# Patient Record
Sex: FEMALE | Race: ASIAN | NOT HISPANIC OR LATINO | URBAN - METROPOLITAN AREA
[De-identification: names, ages, dates, MRNs, and addresses within clinical notes are randomized per-mention and may not be internally consistent; named-entity substitution may affect disease eponyms.]

---

## 2020-01-01 ENCOUNTER — INPATIENT (INPATIENT)
Facility: HOSPITAL | Age: 55
LOS: 0 days | End: 2020-07-12
Attending: INTERNAL MEDICINE | Admitting: INTERNAL MEDICINE
Payer: COMMERCIAL

## 2020-01-01 VITALS — WEIGHT: 134.7 LBS | HEART RATE: 60 BPM | RESPIRATION RATE: 30 BRPM | OXYGEN SATURATION: 58 %

## 2020-01-01 VITALS — SYSTOLIC BLOOD PRESSURE: 135 MMHG | DIASTOLIC BLOOD PRESSURE: 94 MMHG | HEART RATE: 42 BPM

## 2020-01-01 DIAGNOSIS — I21.3 ST ELEVATION (STEMI) MYOCARDIAL INFARCTION OF UNSPECIFIED SITE: ICD-10-CM

## 2020-01-01 DIAGNOSIS — I46.9 CARDIAC ARREST, CAUSE UNSPECIFIED: ICD-10-CM

## 2020-01-01 DIAGNOSIS — E11.9 TYPE 2 DIABETES MELLITUS WITHOUT COMPLICATIONS: ICD-10-CM

## 2020-01-01 DIAGNOSIS — I10 ESSENTIAL (PRIMARY) HYPERTENSION: ICD-10-CM

## 2020-01-01 LAB
ALBUMIN SERPL ELPH-MCNC: 2.7 G/DL — LOW (ref 3.3–5)
ALP SERPL-CCNC: 84 U/L — SIGNIFICANT CHANGE UP (ref 40–120)
ALT FLD-CCNC: 333 U/L — HIGH (ref 4–33)
AMPHET UR-MCNC: NEGATIVE — SIGNIFICANT CHANGE UP
ANION GAP SERPL CALC-SCNC: 25 MMO/L — HIGH (ref 7–14)
APAP SERPL-MCNC: < 15 UG/ML — LOW (ref 15–25)
APPEARANCE UR: CLEAR — SIGNIFICANT CHANGE UP
APTT BLD: 55.1 SEC — HIGH (ref 27–36.3)
AST SERPL-CCNC: 401 U/L — HIGH (ref 4–32)
B-OH-BUTYR SERPL-SCNC: 0.3 MMOL/L — SIGNIFICANT CHANGE UP (ref 0–0.4)
BACTERIA # UR AUTO: NEGATIVE — SIGNIFICANT CHANGE UP
BARBITURATES UR SCN-MCNC: NEGATIVE — SIGNIFICANT CHANGE UP
BASE EXCESS BLDA CALC-SCNC: -13.6 MMOL/L — SIGNIFICANT CHANGE UP
BASE EXCESS BLDA CALC-SCNC: -16.9 MMOL/L — SIGNIFICANT CHANGE UP
BASE EXCESS BLDV CALC-SCNC: -11.1 MMOL/L — SIGNIFICANT CHANGE UP
BASOPHILS # BLD AUTO: 0.05 K/UL — SIGNIFICANT CHANGE UP (ref 0–0.2)
BASOPHILS NFR BLD AUTO: 0.6 % — SIGNIFICANT CHANGE UP (ref 0–2)
BASOPHILS NFR SPEC: 1.2 % — SIGNIFICANT CHANGE UP (ref 0–2)
BENZODIAZ UR-MCNC: NEGATIVE — SIGNIFICANT CHANGE UP
BILIRUB SERPL-MCNC: < 0.2 MG/DL — LOW (ref 0.2–1.2)
BILIRUB UR-MCNC: NEGATIVE — SIGNIFICANT CHANGE UP
BLASTS # FLD: 0 % — SIGNIFICANT CHANGE UP (ref 0–0)
BLD GP AB SCN SERPL QL: NEGATIVE — SIGNIFICANT CHANGE UP
BLOOD GAS ARTERIAL - FIO2: 100 — SIGNIFICANT CHANGE UP
BLOOD GAS VENOUS - CREATININE: 1.05 MG/DL — SIGNIFICANT CHANGE UP (ref 0.5–1.3)
BLOOD UR QL VISUAL: NEGATIVE — SIGNIFICANT CHANGE UP
BUN SERPL-MCNC: 15 MG/DL — SIGNIFICANT CHANGE UP (ref 7–23)
CALCIUM SERPL-MCNC: 12.7 MG/DL — HIGH (ref 8.4–10.5)
CANNABINOIDS UR-MCNC: NEGATIVE — SIGNIFICANT CHANGE UP
CHLORIDE BLDA-SCNC: 104 MMOL/L — SIGNIFICANT CHANGE UP (ref 96–108)
CHLORIDE BLDA-SCNC: 106 MMOL/L — SIGNIFICANT CHANGE UP (ref 96–108)
CHLORIDE BLDV-SCNC: 101 MMOL/L — SIGNIFICANT CHANGE UP (ref 96–108)
CHLORIDE SERPL-SCNC: 94 MMOL/L — LOW (ref 98–107)
CK SERPL-CCNC: 664 U/L — HIGH (ref 25–170)
CO2 SERPL-SCNC: 16 MMOL/L — LOW (ref 22–31)
COCAINE METAB.OTHER UR-MCNC: NEGATIVE — SIGNIFICANT CHANGE UP
COLOR SPEC: SIGNIFICANT CHANGE UP
CREAT SERPL-MCNC: 0.9 MG/DL — SIGNIFICANT CHANGE UP (ref 0.5–1.3)
EOSINOPHIL # BLD AUTO: 0.14 K/UL — SIGNIFICANT CHANGE UP (ref 0–0.5)
EOSINOPHIL NFR BLD AUTO: 1.5 % — SIGNIFICANT CHANGE UP (ref 0–6)
EOSINOPHIL NFR FLD: 3.4 % — SIGNIFICANT CHANGE UP (ref 0–6)
ETHANOL BLD-MCNC: < 10 MG/DL — SIGNIFICANT CHANGE UP
GAS PNL BLDV: 137 MMOL/L — SIGNIFICANT CHANGE UP (ref 136–146)
GIANT PLATELETS BLD QL SMEAR: PRESENT — SIGNIFICANT CHANGE UP
GLUCOSE BLDA-MCNC: 804 MG/DL — CRITICAL HIGH (ref 70–99)
GLUCOSE BLDA-MCNC: 866 MG/DL — CRITICAL HIGH (ref 70–99)
GLUCOSE BLDC GLUCOMTR-MCNC: >600 MG/DL — CRITICAL HIGH (ref 70–99)
GLUCOSE BLDC GLUCOMTR-MCNC: >600 MG/DL — CRITICAL HIGH (ref 70–99)
GLUCOSE BLDV-MCNC: 740 MG/DL — CRITICAL HIGH (ref 70–99)
GLUCOSE SERPL-MCNC: 789 MG/DL — CRITICAL HIGH (ref 70–99)
GLUCOSE UR-MCNC: >1000 — HIGH
HBA1C BLD-MCNC: 12 % — HIGH (ref 4–5.6)
HCO3 BLDA-SCNC: 11 MMOL/L — LOW (ref 22–26)
HCO3 BLDA-SCNC: 13 MMOL/L — LOW (ref 22–26)
HCO3 BLDV-SCNC: 13 MMOL/L — LOW (ref 20–27)
HCT VFR BLD CALC: 36 % — SIGNIFICANT CHANGE UP (ref 34.5–45)
HCT VFR BLDA CALC: 31.9 % — LOW (ref 34.5–46.5)
HCT VFR BLDA CALC: 38 % — SIGNIFICANT CHANGE UP (ref 34.5–46.5)
HCT VFR BLDV CALC: 35.9 % — SIGNIFICANT CHANGE UP (ref 34.5–45)
HGB BLD-MCNC: 11.2 G/DL — LOW (ref 11.5–15.5)
HGB BLDA-MCNC: 10.3 G/DL — LOW (ref 11.5–15.5)
HGB BLDA-MCNC: 12.4 G/DL — SIGNIFICANT CHANGE UP (ref 11.5–15.5)
HGB BLDV-MCNC: 11.6 G/DL — SIGNIFICANT CHANGE UP (ref 11.5–15.5)
HYALINE CASTS # UR AUTO: NEGATIVE — SIGNIFICANT CHANGE UP
IMM GRANULOCYTES NFR BLD AUTO: 7 % — HIGH (ref 0–1.5)
INR BLD: 1.02 — SIGNIFICANT CHANGE UP (ref 0.88–1.17)
KETONES UR-MCNC: NEGATIVE — SIGNIFICANT CHANGE UP
LACTATE BLDA-SCNC: 17 MMOL/L — CRITICAL HIGH (ref 0.5–2)
LACTATE BLDA-SCNC: 24 MMOL/L — CRITICAL HIGH (ref 0.5–2)
LACTATE BLDV-MCNC: 14.7 MMOL/L — CRITICAL HIGH (ref 0.5–2)
LEUKOCYTE ESTERASE UR-ACNC: NEGATIVE — SIGNIFICANT CHANGE UP
LYMPHOCYTES # BLD AUTO: 4.95 K/UL — HIGH (ref 1–3.3)
LYMPHOCYTES # BLD AUTO: 54.6 % — HIGH (ref 13–44)
LYMPHOCYTES NFR SPEC AUTO: 36.8 % — SIGNIFICANT CHANGE UP (ref 13–44)
MAGNESIUM SERPL-MCNC: 2.2 MG/DL — SIGNIFICANT CHANGE UP (ref 1.6–2.6)
MCHC RBC-ENTMCNC: 28.4 PG — SIGNIFICANT CHANGE UP (ref 27–34)
MCHC RBC-ENTMCNC: 31.1 % — LOW (ref 32–36)
MCV RBC AUTO: 91.4 FL — SIGNIFICANT CHANGE UP (ref 80–100)
METAMYELOCYTES # FLD: 2.3 % — HIGH (ref 0–1)
METHADONE UR-MCNC: NEGATIVE — SIGNIFICANT CHANGE UP
MONOCYTES # BLD AUTO: 0.2 K/UL — SIGNIFICANT CHANGE UP (ref 0–0.9)
MONOCYTES NFR BLD AUTO: 2.2 % — SIGNIFICANT CHANGE UP (ref 2–14)
MONOCYTES NFR BLD: 1.2 % — LOW (ref 2–9)
MYELOCYTES NFR BLD: 3.4 % — HIGH (ref 0–0)
NEUTROPHIL AB SER-ACNC: 48.3 % — SIGNIFICANT CHANGE UP (ref 43–77)
NEUTROPHILS # BLD AUTO: 3.09 K/UL — SIGNIFICANT CHANGE UP (ref 1.8–7.4)
NEUTROPHILS NFR BLD AUTO: 34.1 % — LOW (ref 43–77)
NEUTS BAND # BLD: 3.4 % — SIGNIFICANT CHANGE UP (ref 0–6)
NITRITE UR-MCNC: NEGATIVE — SIGNIFICANT CHANGE UP
NRBC # BLD: 3 /100WBC — SIGNIFICANT CHANGE UP
NRBC # FLD: 0.08 K/UL — SIGNIFICANT CHANGE UP (ref 0–0)
NT-PROBNP SERPL-SCNC: 29.5 PG/ML — SIGNIFICANT CHANGE UP
OPIATES UR-MCNC: NEGATIVE — SIGNIFICANT CHANGE UP
OTHER - HEMATOLOGY %: 0 — SIGNIFICANT CHANGE UP
OXYCODONE UR-MCNC: NEGATIVE — SIGNIFICANT CHANGE UP
PCO2 BLDA: 53 MMHG — HIGH (ref 32–48)
PCO2 BLDA: 54 MMHG — HIGH (ref 32–48)
PCO2 BLDV: 95 MMHG — CRITICAL HIGH (ref 41–51)
PCP UR-MCNC: NEGATIVE — SIGNIFICANT CHANGE UP
PH BLDA: 6.99 PH — CRITICAL LOW (ref 7.35–7.45)
PH BLDA: 7.07 PH — CRITICAL LOW (ref 7.35–7.45)
PH BLDV: 6.95 PH — CRITICAL LOW (ref 7.32–7.43)
PH UR: 6 — SIGNIFICANT CHANGE UP (ref 5–8)
PHOSPHATE SERPL-MCNC: 8.5 MG/DL — HIGH (ref 2.5–4.5)
PLATELET # BLD AUTO: 109 K/UL — LOW (ref 150–400)
PLATELET COUNT - ESTIMATE: SIGNIFICANT CHANGE UP
PMV BLD: 11.8 FL — SIGNIFICANT CHANGE UP (ref 7–13)
PO2 BLDA: 107 MMHG — SIGNIFICANT CHANGE UP (ref 83–108)
PO2 BLDA: 56 MMHG — LOW (ref 83–108)
PO2 BLDV: 38 MMHG — SIGNIFICANT CHANGE UP (ref 35–40)
POTASSIUM BLDA-SCNC: 3.8 MMOL/L — SIGNIFICANT CHANGE UP (ref 3.4–4.5)
POTASSIUM BLDA-SCNC: 4.2 MMOL/L — SIGNIFICANT CHANGE UP (ref 3.4–4.5)
POTASSIUM BLDV-SCNC: 4.2 MMOL/L — SIGNIFICANT CHANGE UP (ref 3.4–4.5)
POTASSIUM SERPL-MCNC: 4.3 MMOL/L — SIGNIFICANT CHANGE UP (ref 3.5–5.3)
POTASSIUM SERPL-SCNC: 4.3 MMOL/L — SIGNIFICANT CHANGE UP (ref 3.5–5.3)
PROMYELOCYTES # FLD: 0 % — SIGNIFICANT CHANGE UP (ref 0–0)
PROT SERPL-MCNC: 4.9 G/DL — LOW (ref 6–8.3)
PROT UR-MCNC: 20 — SIGNIFICANT CHANGE UP
PROTHROM AB SERPL-ACNC: 11.6 SEC — SIGNIFICANT CHANGE UP (ref 9.8–13.1)
RBC # BLD: 3.94 M/UL — SIGNIFICANT CHANGE UP (ref 3.8–5.2)
RBC # FLD: 12.5 % — SIGNIFICANT CHANGE UP (ref 10.3–14.5)
RBC CASTS # UR COMP ASSIST: SIGNIFICANT CHANGE UP (ref 0–?)
REVIEW TO FOLLOW: YES — SIGNIFICANT CHANGE UP
RH IG SCN BLD-IMP: POSITIVE — SIGNIFICANT CHANGE UP
SALICYLATES SERPL-MCNC: < 5 MG/DL — LOW (ref 15–30)
SAO2 % BLDA: 77.8 % — LOW (ref 95–99)
SAO2 % BLDA: 94.2 % — LOW (ref 95–99)
SAO2 % BLDV: 43.6 % — LOW (ref 60–85)
SARS-COV-2 RNA SPEC QL NAA+PROBE: SIGNIFICANT CHANGE UP
SMUDGE CELLS # BLD: PRESENT — SIGNIFICANT CHANGE UP
SODIUM BLDA-SCNC: 137 MMOL/L — SIGNIFICANT CHANGE UP (ref 136–146)
SODIUM BLDA-SCNC: 145 MMOL/L — SIGNIFICANT CHANGE UP (ref 136–146)
SODIUM SERPL-SCNC: 135 MMOL/L — SIGNIFICANT CHANGE UP (ref 135–145)
SP GR SPEC: 1.04 — SIGNIFICANT CHANGE UP (ref 1–1.04)
SQUAMOUS # UR AUTO: SIGNIFICANT CHANGE UP
TROPONIN T, HIGH SENSITIVITY: 102 NG/L — CRITICAL HIGH (ref ?–14)
TSH SERPL-MCNC: 3.97 UIU/ML — SIGNIFICANT CHANGE UP (ref 0.27–4.2)
UROBILINOGEN FLD QL: NORMAL — SIGNIFICANT CHANGE UP
VARIANT LYMPHS # BLD: 0 % — SIGNIFICANT CHANGE UP
WBC # BLD: 9.06 K/UL — SIGNIFICANT CHANGE UP (ref 3.8–10.5)
WBC # FLD AUTO: 9.06 K/UL — SIGNIFICANT CHANGE UP (ref 3.8–10.5)
WBC UR QL: SIGNIFICANT CHANGE UP (ref 0–?)

## 2020-01-01 PROCEDURE — 36561 INSERT TUNNELED CV CATH: CPT

## 2020-01-01 PROCEDURE — 70450 CT HEAD/BRAIN W/O DYE: CPT | Mod: 26

## 2020-01-01 PROCEDURE — 36600 WITHDRAWAL OF ARTERIAL BLOOD: CPT | Mod: 59

## 2020-01-01 PROCEDURE — 99291 CRITICAL CARE FIRST HOUR: CPT | Mod: 25

## 2020-01-01 PROCEDURE — 43753 TX GASTRO INTUB W/ASP: CPT | Mod: 59

## 2020-01-01 PROCEDURE — 99292 CRITICAL CARE ADDL 30 MIN: CPT | Mod: 25

## 2020-01-01 PROCEDURE — 71045 X-RAY EXAM CHEST 1 VIEW: CPT | Mod: 26

## 2020-01-01 PROCEDURE — 99223 1ST HOSP IP/OBS HIGH 75: CPT

## 2020-01-01 RX ORDER — ASPIRIN/CALCIUM CARB/MAGNESIUM 324 MG
324 TABLET ORAL ONCE
Refills: 0 | Status: DISCONTINUED | OUTPATIENT
Start: 2020-01-01 | End: 2020-01-01

## 2020-01-01 RX ORDER — CHLORHEXIDINE GLUCONATE 213 G/1000ML
15 SOLUTION TOPICAL EVERY 12 HOURS
Refills: 0 | Status: DISCONTINUED | OUTPATIENT
Start: 2020-01-01 | End: 2020-01-01

## 2020-01-01 RX ORDER — VASOPRESSIN 20 [USP'U]/ML
0.04 INJECTION INTRAVENOUS
Qty: 50 | Refills: 0 | Status: DISCONTINUED | OUTPATIENT
Start: 2020-01-01 | End: 2020-01-01

## 2020-01-01 RX ORDER — METFORMIN HYDROCHLORIDE 850 MG/1
1 TABLET ORAL
Qty: 0 | Refills: 0 | DISCHARGE

## 2020-01-01 RX ORDER — TICAGRELOR 90 MG/1
180 TABLET ORAL ONCE
Refills: 0 | Status: COMPLETED | OUTPATIENT
Start: 2020-01-01 | End: 2020-01-01

## 2020-01-01 RX ORDER — HEPARIN SODIUM 5000 [USP'U]/ML
4000 INJECTION INTRAVENOUS; SUBCUTANEOUS ONCE
Refills: 0 | Status: COMPLETED | OUTPATIENT
Start: 2020-01-01 | End: 2020-01-01

## 2020-01-01 RX ORDER — HEPARIN SODIUM 5000 [USP'U]/ML
INJECTION INTRAVENOUS; SUBCUTANEOUS
Qty: 25000 | Refills: 0 | Status: DISCONTINUED | OUTPATIENT
Start: 2020-01-01 | End: 2020-01-01

## 2020-01-01 RX ORDER — SEMAGLUTIDE 0.68 MG/ML
0 INJECTION, SOLUTION SUBCUTANEOUS
Qty: 0 | Refills: 0 | DISCHARGE

## 2020-01-01 RX ORDER — NOREPINEPHRINE BITARTRATE/D5W 8 MG/250ML
0.05 PLASTIC BAG, INJECTION (ML) INTRAVENOUS
Qty: 16 | Refills: 0 | Status: DISCONTINUED | OUTPATIENT
Start: 2020-01-01 | End: 2020-01-01

## 2020-01-01 RX ORDER — PIPERACILLIN AND TAZOBACTAM 4; .5 G/20ML; G/20ML
3.38 INJECTION, POWDER, LYOPHILIZED, FOR SOLUTION INTRAVENOUS ONCE
Refills: 0 | Status: COMPLETED | OUTPATIENT
Start: 2020-01-01 | End: 2020-01-01

## 2020-01-01 RX ORDER — INSULIN HUMAN 100 [IU]/ML
10 INJECTION, SOLUTION SUBCUTANEOUS
Qty: 100 | Refills: 0 | Status: DISCONTINUED | OUTPATIENT
Start: 2020-01-01 | End: 2020-01-01

## 2020-01-01 RX ORDER — EPINEPHRINE 0.3 MG/.3ML
1 INJECTION INTRAMUSCULAR; SUBCUTANEOUS
Qty: 4 | Refills: 0 | Status: DISCONTINUED | OUTPATIENT
Start: 2020-01-01 | End: 2020-01-01

## 2020-01-01 RX ORDER — AMIODARONE HYDROCHLORIDE 400 MG/1
1 TABLET ORAL
Qty: 900 | Refills: 0 | Status: DISCONTINUED | OUTPATIENT
Start: 2020-01-01 | End: 2020-01-01

## 2020-01-01 RX ORDER — SODIUM CHLORIDE 9 MG/ML
1000 INJECTION, SOLUTION INTRAVENOUS ONCE
Refills: 0 | Status: COMPLETED | OUTPATIENT
Start: 2020-01-01 | End: 2020-01-01

## 2020-01-01 RX ORDER — SODIUM BICARBONATE 1 MEQ/ML
0.05 SYRINGE (ML) INTRAVENOUS
Qty: 50 | Refills: 0 | Status: DISCONTINUED | OUTPATIENT
Start: 2020-01-01 | End: 2020-01-01

## 2020-01-01 RX ORDER — LOSARTAN POTASSIUM 100 MG/1
1 TABLET, FILM COATED ORAL
Qty: 0 | Refills: 0 | DISCHARGE

## 2020-01-01 RX ORDER — HEPARIN SODIUM 5000 [USP'U]/ML
4000 INJECTION INTRAVENOUS; SUBCUTANEOUS EVERY 6 HOURS
Refills: 0 | Status: DISCONTINUED | OUTPATIENT
Start: 2020-01-01 | End: 2020-01-01

## 2020-01-01 RX ORDER — ASPIRIN/CALCIUM CARB/MAGNESIUM 324 MG
325 TABLET ORAL ONCE
Refills: 0 | Status: COMPLETED | OUTPATIENT
Start: 2020-01-01 | End: 2020-01-01

## 2020-01-01 RX ORDER — HEPARIN SODIUM 5000 [USP'U]/ML
1000 INJECTION INTRAVENOUS; SUBCUTANEOUS
Qty: 25000 | Refills: 0 | Status: DISCONTINUED | OUTPATIENT
Start: 2020-01-01 | End: 2020-01-01

## 2020-01-01 RX ORDER — CLOPIDOGREL BISULFATE 75 MG/1
300 TABLET, FILM COATED ORAL ONCE
Refills: 0 | Status: DISCONTINUED | OUTPATIENT
Start: 2020-01-01 | End: 2020-01-01

## 2020-01-01 RX ORDER — PIPERACILLIN AND TAZOBACTAM 4; .5 G/20ML; G/20ML
3.38 INJECTION, POWDER, LYOPHILIZED, FOR SOLUTION INTRAVENOUS EVERY 8 HOURS
Refills: 0 | Status: DISCONTINUED | OUTPATIENT
Start: 2020-01-01 | End: 2020-01-01

## 2020-01-01 RX ORDER — SODIUM BICARBONATE 1 MEQ/ML
0.09 SYRINGE (ML) INTRAVENOUS
Qty: 75 | Refills: 0 | Status: DISCONTINUED | OUTPATIENT
Start: 2020-01-01 | End: 2020-01-01

## 2020-01-01 RX ADMIN — Medication 325 MILLIGRAM(S): at 07:09

## 2020-01-01 RX ADMIN — PIPERACILLIN AND TAZOBACTAM 200 GRAM(S): 4; .5 INJECTION, POWDER, LYOPHILIZED, FOR SOLUTION INTRAVENOUS at 03:49

## 2020-01-01 RX ADMIN — TICAGRELOR 180 MILLIGRAM(S): 90 TABLET ORAL at 07:08

## 2020-01-01 RX ADMIN — EPINEPHRINE 300 MICROGRAM(S)/KG/MIN: 0.3 INJECTION INTRAMUSCULAR; SUBCUTANEOUS at 04:33

## 2020-01-01 RX ADMIN — INSULIN HUMAN 7 UNIT(S)/HR: 100 INJECTION, SOLUTION SUBCUTANEOUS at 04:33

## 2020-01-01 RX ADMIN — Medication 3.75 MICROGRAM(S)/KG/MIN: at 07:57

## 2020-01-01 RX ADMIN — Medication 100 MEQ/KG/HR: at 04:23

## 2020-01-01 RX ADMIN — VASOPRESSIN 2.4 UNIT(S)/MIN: 20 INJECTION INTRAVENOUS at 07:59

## 2020-01-01 RX ADMIN — SODIUM CHLORIDE 1000 MILLILITER(S): 9 INJECTION, SOLUTION INTRAVENOUS at 03:23

## 2020-01-01 RX ADMIN — INSULIN HUMAN 10 UNIT(S)/HR: 100 INJECTION, SOLUTION SUBCUTANEOUS at 07:57

## 2020-01-01 RX ADMIN — Medication 100 MEQ/KG/HR: at 07:58

## 2020-01-01 RX ADMIN — EPINEPHRINE 300 MICROGRAM(S)/KG/MIN: 0.3 INJECTION INTRAMUSCULAR; SUBCUTANEOUS at 07:12

## 2020-01-01 RX ADMIN — HEPARIN SODIUM 4000 UNIT(S): 5000 INJECTION INTRAVENOUS; SUBCUTANEOUS at 04:24

## 2020-01-01 RX ADMIN — HEPARIN SODIUM 10 UNIT(S)/HR: 5000 INJECTION INTRAVENOUS; SUBCUTANEOUS at 07:56

## 2020-01-01 RX ADMIN — HEPARIN SODIUM 950 UNIT(S)/HR: 5000 INJECTION INTRAVENOUS; SUBCUTANEOUS at 04:23

## 2020-07-12 NOTE — ED ADULT NURSE REASSESSMENT NOTE - CONDITION
handoff rpt received from VALERY Arce pt prepared for transport. handoff rpt received from VALERY Arce pt Admitted CCU DX DKA,STEMI pt prepared for transport.

## 2020-07-12 NOTE — ED PROVIDER NOTE - CLINICAL SUMMARY MEDICAL DECISION MAKING FREE TEXT BOX
54F with hx of DM presenting as cardiac arrest. Presumed to be STEMI with possible DKA as complication. Will send ACS, DKA labs, EKG, CXR, TBA.

## 2020-07-12 NOTE — H&P ADULT - PROBLEM SELECTOR PLAN 2
LANNY in all leads.   No cath at this time  Continue heparin gtt  Give ASA 365mg, Plavix 600mg load when OG tube placed

## 2020-07-12 NOTE — H&P ADULT - PROBLEM SELECTOR PLAN 3
H/O DM II on metformin. HgA1c >12, BS >600  Continue Insulin gtt for presumed DKA  Beta Hydroxy - Butyrate pending

## 2020-07-12 NOTE — DISCHARGE NOTE FOR THE EXPIRED PATIENT - HOSPITAL COURSE
55 yo female with PMH of HTN, DM BIBEMS as cardiac arrest. Patient was at a party earlier tonight, found pulseless around 40 minutes PTA. Initial rhythm was Vfib. EMS intubated the patient in the field, shocked 3 times and pushed epi 4 times. ROSC was achieved for 8 minutes, but again lost pulses. Found to be in asystole and chest compressions were resumed. Per EMS, FS read as high. In ED intubated with EMS performing chest compressions. EKG showed STEMI. patient received a few rounds of epi, bicarb x 2, calcium x 2. Patient eventually achieved ROSC. Started on epi drip, insulin drip for presumed DKA. Left crash fem placed, right IO placed. During chest compressions, large amounts of gastric contents aspirated. Given Vanc/Zosyn. Case discussed with interventionalist. Patient was not a candidate for cardiac cathterization. CT head done revealing catastrophic anoxic brain injury. Patient  in the CCU, on ventillator with maximum pressors and transcutaneous pacing. At 8:06, she lost pulse, RN, NP and family  at bedside at this time and opted for no further compressions, oral DNR given by family and patient was pronounced at 8:06am.

## 2020-07-12 NOTE — ED ADULT NURSE REASSESSMENT NOTE - NS ED NURSE REASSESS COMMENT FT1
Nell RN pt ready for transport: CCU NP called notified requesting  asst with transport. ED MD Mooney notified.Pt hypotensive 73/44 epi gtt increased.BP improving 106/75 hr 78 pox96% pt transported to CCU8 Nell RN pt ready for transport: CCU NP called notified requesting  asst with transport. ED MD Mooney notified.Pt hypotensive 73/44 epi gtt increased.BP improving 106/75 hr 78 pox96% pt transported to CCU8  Pt received with mult infusions #1Hepgtt at 9.5 infusing via left ac. #2Insulin gtt at 7cc/7units/hr.   Left femoral central line, with #3bicarb gtt at 75cc, #4 Epinephrine gtt at .6mcg/kg/min.  Vent setting , rr 22 fio2 100% PEEP/Cpap5.   repeat FBSHI Nell RN pt ready for transport: CCU NP called notified requesting  asst with transport. ED MD Mooney notified.Pt hypotensive 73/44 epi gtt increased.BP improving 106/75 hr 78 pox96% pt transported to CCU8  Pt received with mult infusions #1Hepgtt at 9.5 infusing via left ac. #2Insulin gtt at 7cc/7units/hr.   Left femoral central line, with #3bicarb gtt at 75cc, #4 Epinephrine gtt at .6mcg/kg/min.  Vent setting , rr 30 fio2 100% PEEP/Cpap8.   repeat FBSHI

## 2020-07-12 NOTE — ED PROVIDER NOTE - OBJECTIVE STATEMENT
54F with hx of DM presenting as cardiac arrest. Per EMS, patient was at a party earlier tonight, found pulseless around 40 minutes PTA. Initial rhythm was Vfib. EMS intubated the patient in the field, shocked 3 times and pushed epi 4 times. ROSC was achieved for 8 minutes, but again lost pulses. Found to be in asystole and chest compressions were resumed. Per EMS, FS read as high. Patient arrived in Trauma room C intubated with EMS performing chest compressions, with right hand IV in place. EKG showed STEMI. In ED, patient received a few rounds of epi, bicarb x 2, calcium x 2. Patient eventually achieved ROSC. Started on epi drip, insulin drip for presumed DKA. Left crash fem placed, right IO placed. 54F with hx of DM presenting as cardiac arrest. Per EMS, patient was at a party earlier tonight, found pulseless around 40 minutes PTA. Initial rhythm was Vfib. EMS intubated the patient in the field, shocked 3 times and pushed epi 4 times. ROSC was achieved for 8 minutes, but again lost pulses. Found to be in asystole and chest compressions were resumed. Per EMS, FS read as high. Patient arrived in Trauma room C intubated with EMS performing chest compressions, with right hand IV in place. EKG showed STEMI. In ED, patient received a few rounds of epi, bicarb x 2, calcium x 2. Patient eventually achieved ROSC. Started on epi drip, insulin drip for presumed DKA. Left crash fem placed, right IO placed. During chest compressions, large amounts of gastric contents aspirated.

## 2020-07-12 NOTE — ED ADULT NURSE REASSESSMENT NOTE - NS ED NURSE REASSESS COMMENT FT1
Patient was intubted by MD breathing w. ease on vent Tidal 430 Peep 5 RR 22 02 75%. Lip line 22. Epi driplower to 0.5mcg/kg/min

## 2020-07-12 NOTE — ED PROCEDURE NOTE - CPROC ED INFUS LINE DETAIL1
Ultrasound guidance was used during placement./All lumen(s) aspirated and flushed without difficulty./The guidewire was recovered.

## 2020-07-12 NOTE — ED PROVIDER NOTE - CARE PLAN
Principal Discharge DX:	Cardiac arrest  Secondary Diagnosis:	DKA (diabetic ketoacidoses)  Secondary Diagnosis:	Acidosis  Secondary Diagnosis:	ST elevation myocardial infarction (STEMI), unspecified artery

## 2020-07-12 NOTE — ED PROVIDER NOTE - ATTENDING CONTRIBUTION TO CARE
Attending MD Mooney.  Agree with above.  Pt is a 55 yo female with pmhx of DM who presents to ED BIBEMS after she reportedly was at a party this evening, got home, wasn't feeling well and was noted to become suddenly unresponsive and on EMS arrival was in vfib.  Pt was shocked x 3 and received 4 epi's, 300 amio and had FS 'high' in field.  EMS achieved ROSC x 8 min but pt then arrested again in asystole en route.  Pt intubated in field. On arrival to ED pt in asystole, coded per nursing documentation.  Endotracheal tube confirmed direct visualization, bilateral breath sounds.  Please refer to code chart for meds/timing.  Received calcium, epi, additional amio, 10 units insulin, bicarb.  L femoral central line placed, R tibial IO placed.  OG placed.  Pt begun on epi drip, insulin drip and bicarb drip.  Cardiology called immediately following 12 lead EKG after ROSC achieved and EKG demonstrates concern for STEMI.  Given finger stick 'high' and EKG c/w concern for severe hyperkalemia.  Cards deferring on cath at this time. MICU called for admission.    Pt's daughter in family room.  (Ofelai Scott [phone] - pt's primary decision maker, pt's only child, no sig other, Pt's mother is secondary decision maker Heather Julianna [phone]) Pt's daughter updated re: status.  Endorses hx of DM on metformin/Januvia, HTN on losartan.  Daughter states that pt was at a party dancing and began to endorse central CP, generalized malaise and headache.  She went home and took a shower then made a cup of tea and was witnessed arrest. Pt lives with daughter and mother.  Rest of family (inc mother and brother) in parking lot and have also been updated by myself and Resident physician Aramis.  Family advised of critical condition and concern for underlying brain injury.  Counseled re: next 24-48 hours being critical to both determine pt's likelihood of survival as well as her degree of brain injury.  Family asks to be kept updated.  Daughter to be point of first contact.  At this time pt is full code with all necessary interventions desired.  Please keep daughter informed.    0334 - VBG without sig hyperK concerning for EKG changes reflective of underlying cardiac ischemia.  Cardiology recalled 2/2 change in presumptive etiology.  ABG pH 6.99, glucose 866, lactate 17   0350 - Cards in ED.  Repeat EKG concerning for STEMI. In absence of convincing hyperkalemia concern for STEMI.  Heparin drip ordered.  0410 - Cards has seen pt again s/p repeat EKG and states that 2/2 lack of mental status they will not cath pt.  In agreement with CT head and heparin drip/Plavix/ASA thereafter.  Will commence hypothermia protocol. Cards accepting pt for admission to CCU.

## 2020-07-12 NOTE — H&P ADULT - HISTORY OF PRESENT ILLNESS
53 yo female with PMH of HTN, DM presenting as cardiac arrest. Per EMS, patient was at a party earlier tonight, found pulseless around 40 minutes PTA. Initial rhythm was Vfib. EMS intubated the patient in the field, shocked 3 times and pushed epi 4 times. ROSC was achieved for 8 minutes, but again lost pulses. Found to be in asystole and chest compressions were resumed. Per EMS, FS read as high. Patient arrived in Trauma room C intubated with EMS performing chest compressions, with right hand IV in place. EKG showed STEMI. In ED, patient received a few rounds of epi, bicarb x 2, calcium x 2. Patient eventually achieved ROSC. Started on epi drip, insulin drip for presumed DKA. Left crash fem placed, right IO placed. During chest compressions, large amounts of gastric contents aspirated. 53 yo female with PMH of HTN, DM BIBEMS as cardiac arrest. Patient was at a party earlier tonight, found pulseless around 40 minutes PTA. Initial rhythm was Vfib. EMS intubated the patient in the field, shocked 3 times and pushed epi 4 times. ROSC was achieved for 8 minutes, but again lost pulses. Found to be in asystole and chest compressions were resumed. Per EMS, FS read as high. In ED intubated with EMS performing chest compressions. EKG showed STEMI. patient received a few rounds of epi, bicarb x 2, calcium x 2. Patient eventually achieved ROSC. Started on epi drip, insulin drip for presumed DKA. Left crash fem placed, right IO placed. During chest compressions, large amounts of gastric contents aspirated. Given Vanc/Zosyn. Case discussed with interventionalist. Not a candidate for cath at this time. Transfer to CCU.

## 2020-07-12 NOTE — ED PROCEDURE NOTE - NS ED PROCEDURE ASSISTED BY
Supervision was available
Supervision was available
The procedure was performed independently/Supervision was available

## 2020-07-12 NOTE — ED ADULT NURSE REASSESSMENT NOTE - ANCILLARY STATUS
ccu at bedside,/respiratory therapy at bedside/physician at bedside ccu NP  at bedside,/physician at bedside/respiratory therapy at bedside

## 2020-07-12 NOTE — H&P ADULT - PROBLEM SELECTOR PLAN 1
Pt down 40 minutes PTA of EMS. Intubated in field Vfib arrest with ROSC after 8 minutes. Asystole with ROSC. In ED Asystole with ROSC. Transferred to CCU Asystole with rosc after 18 minutes    Continue Epi gtt  Continue Levo gtt Pt down 40 minutes PTA of EMS. Intubated in field Vfib arrest with ROSC after 8 minutes. Asystole with ROSC. In ED Asystole with ROSC. Transferred to CCU Asystole with rosc after 18 minutes  Continue Epi gtt  Continue Levo gtt  Continue Vasopression  Continue Bicarb gtt  Hypothermia protocol

## 2020-07-12 NOTE — ED ADULT NURSE REASSESSMENT NOTE - STATUS
as per RN Claude Report to CCU bed 8  Respiratory at bedside for transport/awaiting transfer, no change

## 2020-07-12 NOTE — H&P ADULT - ASSESSMENT
53 yo female with PMH of HTN, DM BIBEMS as cardiac arrest. Patient was at a party earlier tonight, found pulseless around 40 minutes PTA. Initial rhythm was Vfib. EMS intubated the patient in the field, shocked 3 times and pushed epi 4 times. ROSC was achieved for 8 minutes, but again lost pulses. Found to be in asystole and chest compressions were resumed. Per EMS, FS read as high. In ED intubated with EMS performing chest compressions. EKG showed STEMI. patient received a few rounds of epi, bicarb x 2, calcium x 2. Patient eventually achieved ROSC. Started on epi drip, insulin drip for presumed DKA. Left crash fem placed, right IO placed. During chest compressions, large amounts of gastric contents aspirated. Given Vanc/Zosyn. Case discussed with interventionalist. Not a candidate for cath at this time. Transfer to CCU.

## 2020-07-12 NOTE — ED PROVIDER NOTE - CRITICAL CARE PROVIDED
consultation with other physicians/interpretation of diagnostic studies/documentation/direct patient care (not related to procedure)/consult w/ pt's family directly relating to pts condition/additional history taking

## 2020-07-12 NOTE — ED PROVIDER NOTE - PROGRESS NOTE DETAILS
Aramis: cardiology at bedside. Will not cath patient. Aramis: cardiology at bedside. Will not cath patient. MICU consulted.

## 2020-07-13 LAB
CULTURE RESULTS: NO GROWTH — SIGNIFICANT CHANGE UP
SPECIMEN SOURCE: SIGNIFICANT CHANGE UP

## 2020-07-17 LAB
CULTURE RESULTS: SIGNIFICANT CHANGE UP
CULTURE RESULTS: SIGNIFICANT CHANGE UP
SPECIMEN SOURCE: SIGNIFICANT CHANGE UP
SPECIMEN SOURCE: SIGNIFICANT CHANGE UP

## 2023-06-11 NOTE — CHART NOTE - NSCHARTNOTEFT_GEN_A_CORE
54F with hx of DM presenting as cardiac arrest. Per EMS, patient was at a party earlier [facility], found pulseless around 40 minutes. Initial rhythm was Vfib. EMS intubated the patient in the field, shocked 3 times and pushed epi 4 times. ROSC was achieved for 8 minutes, but again lost pulses. Found to be in asystole and chest compressions were resumed. Per EMS, FS read as high. In the ER, EKG showed ST elevation in all leads,  patient received additional rounds of epi, bicarb x 2, calcium x 2. Patient eventually achieved ROSC and started on epi drip, insulin drip for presumed DKA. Left crash fem placed, right IO placed. During chest compressions, large amounts of gastric contents aspirated.  Case was presented to Dr. Funk, ECG likely hyperkalemia.  Patient is not a cath candidate at this time. 54F with hx of DM presenting as cardiac arrest. Per EMS, patient was at a party earlier [facility], found pulseless around 40 minutes. Initial rhythm was Vfib. EMS intubated the patient in the field, shocked 3 times and pushed epi 4 times. ROSC was achieved for 8 minutes, but again lost pulses. Found to be in asystole and chest compressions were resumed. Per EMS, FS read as high. In the ER, EKG showed ST elevation in all leads with second degree heart block,  patient received additional rounds of epi, bicarb x 2, calcium x 2. Patient eventually achieved ROSC and started on epi drip, insulin drip for presumed DKA. Left crash fem placed, right IO placed. During chest compressions, large amounts of gastric contents aspirated.  Case was presented to Dr. Funk, ECG likely hyperkalemia.  Patient is not a cath candidate at this time. 54F with hx of DM presenting as cardiac arrest. Per EMS, patient was at a party earlier [facility], found pulseless around 40 minutes. Initial rhythm was Vfib. EMS intubated the patient in the field, shocked 3 times and pushed epi 4 times. ROSC was achieved for 8 minutes, but again lost pulses. Found to be in asystole and chest compressions were resumed. Per EMS, FS read as high. In the ER, EKG showed ST elevation in all leads with second degree heart block,  patient received additional rounds of epi, bicarb x 2, calcium x 2. Patient eventually achieved ROSC and started on epi drip, insulin drip for presumed DKA. Left crash fem placed, right IO placed. During chest compressions, large amounts of gastric contents aspirated. Patient is currently unresponsive in the ED. Case was presented to Dr. Funk, ECG likely hyperkalemia.  Patient is not a cath candidate at this time. 54F with hx of DM presenting as cardiac arrest. Per EMS, patient was at a party earlier [facility], found pulseless around 40 minutes. Initial rhythm was Vfib. EMS intubated the patient in the field, shocked 3 times and pushed epi 4 times. ROSC was achieved for 8 minutes, but again lost pulses. Found to be in asystole and chest compressions were resumed. Per EMS, FS read as high. In the ER, EKG showed ST elevation in all leads with second degree heart block,  patient received additional rounds of epi, bicarb x 2, calcium x 2. Patient eventually achieved ROSC and started on epi drip, insulin drip for presumed DKA. Left crash fem placed, right IO placed. During chest compressions, large amounts of gastric contents aspirated. Patient is currently unresponsive in the ED. Case was presented to Dr. Funk, ECG likely AWMI/hyperkalemia.  Patient is not a cath candidate at this time. Yes

## 2025-01-08 NOTE — ED PROVIDER NOTE - PHYSICAL EXAMINATION
GENERAL: critically ill  HEAD: atraumatic, normocephalic  CARDIAC: RRR, normal S1S2,  no appreciable murmurs, no cyanosis, cap refill < 2 seconds  PULM: intubated  GI: abdomen distended  NEURO: intubated  MSK: spine appears normal, no gross deformities of extremities  EXT: no peripheral edema, calf tenderness, redness or swelling  SKIN: warm, dry, and intact, no rashes
None